# Patient Record
Sex: FEMALE | Race: BLACK OR AFRICAN AMERICAN | NOT HISPANIC OR LATINO
[De-identification: names, ages, dates, MRNs, and addresses within clinical notes are randomized per-mention and may not be internally consistent; named-entity substitution may affect disease eponyms.]

---

## 2019-06-07 ENCOUNTER — TRANSCRIPTION ENCOUNTER (OUTPATIENT)
Age: 34
End: 2019-06-07

## 2019-06-08 ENCOUNTER — INPATIENT (INPATIENT)
Facility: HOSPITAL | Age: 34
LOS: 0 days | Discharge: HOME | End: 2019-06-09
Attending: SPECIALIST | Admitting: SPECIALIST
Payer: COMMERCIAL

## 2019-06-08 VITALS
SYSTOLIC BLOOD PRESSURE: 119 MMHG | HEART RATE: 86 BPM | TEMPERATURE: 99 F | DIASTOLIC BLOOD PRESSURE: 70 MMHG | RESPIRATION RATE: 18 BRPM | OXYGEN SATURATION: 100 %

## 2019-06-08 DIAGNOSIS — K81.0 ACUTE CHOLECYSTITIS: ICD-10-CM

## 2019-06-08 LAB
ALBUMIN SERPL ELPH-MCNC: 3.7 G/DL — SIGNIFICANT CHANGE UP (ref 3.3–5)
ALP SERPL-CCNC: 71 U/L — SIGNIFICANT CHANGE UP (ref 40–120)
ALT FLD-CCNC: 219 U/L — HIGH (ref 4–33)
ANION GAP SERPL CALC-SCNC: 15 MMO/L — HIGH (ref 7–14)
APPEARANCE UR: CLEAR — SIGNIFICANT CHANGE UP
AST SERPL-CCNC: 65 U/L — HIGH (ref 4–32)
BACTERIA # UR AUTO: SIGNIFICANT CHANGE UP
BASOPHILS # BLD AUTO: 0.06 K/UL — SIGNIFICANT CHANGE UP (ref 0–0.2)
BASOPHILS NFR BLD AUTO: 0.5 % — SIGNIFICANT CHANGE UP (ref 0–2)
BILIRUB SERPL-MCNC: 0.5 MG/DL — SIGNIFICANT CHANGE UP (ref 0.2–1.2)
BILIRUB UR-MCNC: NEGATIVE — SIGNIFICANT CHANGE UP
BLOOD UR QL VISUAL: NEGATIVE — SIGNIFICANT CHANGE UP
BUN SERPL-MCNC: 8 MG/DL — SIGNIFICANT CHANGE UP (ref 7–23)
CALCIUM SERPL-MCNC: 8.9 MG/DL — SIGNIFICANT CHANGE UP (ref 8.4–10.5)
CHLORIDE SERPL-SCNC: 101 MMOL/L — SIGNIFICANT CHANGE UP (ref 98–107)
CO2 SERPL-SCNC: 18 MMOL/L — LOW (ref 22–31)
COLOR SPEC: SIGNIFICANT CHANGE UP
CREAT SERPL-MCNC: 0.64 MG/DL — SIGNIFICANT CHANGE UP (ref 0.5–1.3)
EOSINOPHIL # BLD AUTO: 0.03 K/UL — SIGNIFICANT CHANGE UP (ref 0–0.5)
EOSINOPHIL NFR BLD AUTO: 0.2 % — SIGNIFICANT CHANGE UP (ref 0–6)
GLUCOSE SERPL-MCNC: 80 MG/DL — SIGNIFICANT CHANGE UP (ref 70–99)
GLUCOSE UR-MCNC: NEGATIVE — SIGNIFICANT CHANGE UP
HCG SERPL-ACNC: SIGNIFICANT CHANGE UP MIU/ML
HCT VFR BLD CALC: 33.7 % — LOW (ref 34.5–45)
HGB BLD-MCNC: 10.9 G/DL — LOW (ref 11.5–15.5)
HYALINE CASTS # UR AUTO: NEGATIVE — SIGNIFICANT CHANGE UP
IMM GRANULOCYTES NFR BLD AUTO: 0.5 % — SIGNIFICANT CHANGE UP (ref 0–1.5)
KETONES UR-MCNC: SIGNIFICANT CHANGE UP
LEUKOCYTE ESTERASE UR-ACNC: SIGNIFICANT CHANGE UP
LIDOCAIN IGE QN: 28.9 U/L — SIGNIFICANT CHANGE UP (ref 7–60)
LYMPHOCYTES # BLD AUTO: 1.97 K/UL — SIGNIFICANT CHANGE UP (ref 1–3.3)
LYMPHOCYTES # BLD AUTO: 15.1 % — SIGNIFICANT CHANGE UP (ref 13–44)
MCHC RBC-ENTMCNC: 26.7 PG — LOW (ref 27–34)
MCHC RBC-ENTMCNC: 32.3 % — SIGNIFICANT CHANGE UP (ref 32–36)
MCV RBC AUTO: 82.6 FL — SIGNIFICANT CHANGE UP (ref 80–100)
MONOCYTES # BLD AUTO: 0.91 K/UL — HIGH (ref 0–0.9)
MONOCYTES NFR BLD AUTO: 7 % — SIGNIFICANT CHANGE UP (ref 2–14)
NEUTROPHILS # BLD AUTO: 9.99 K/UL — HIGH (ref 1.8–7.4)
NEUTROPHILS NFR BLD AUTO: 76.7 % — SIGNIFICANT CHANGE UP (ref 43–77)
NITRITE UR-MCNC: NEGATIVE — SIGNIFICANT CHANGE UP
NRBC # FLD: 0 K/UL — SIGNIFICANT CHANGE UP (ref 0–0)
PH UR: 7 — SIGNIFICANT CHANGE UP (ref 5–8)
PLATELET # BLD AUTO: 263 K/UL — SIGNIFICANT CHANGE UP (ref 150–400)
PMV BLD: 10.2 FL — SIGNIFICANT CHANGE UP (ref 7–13)
POTASSIUM SERPL-MCNC: 4.2 MMOL/L — SIGNIFICANT CHANGE UP (ref 3.5–5.3)
POTASSIUM SERPL-SCNC: 4.2 MMOL/L — SIGNIFICANT CHANGE UP (ref 3.5–5.3)
PROT SERPL-MCNC: 7.5 G/DL — SIGNIFICANT CHANGE UP (ref 6–8.3)
PROT UR-MCNC: NEGATIVE — SIGNIFICANT CHANGE UP
RBC # BLD: 4.08 M/UL — SIGNIFICANT CHANGE UP (ref 3.8–5.2)
RBC # FLD: 13.3 % — SIGNIFICANT CHANGE UP (ref 10.3–14.5)
RBC CASTS # UR COMP ASSIST: SIGNIFICANT CHANGE UP (ref 0–?)
SODIUM SERPL-SCNC: 134 MMOL/L — LOW (ref 135–145)
SP GR SPEC: 1.02 — SIGNIFICANT CHANGE UP (ref 1–1.04)
SQUAMOUS # UR AUTO: SIGNIFICANT CHANGE UP
UROBILINOGEN FLD QL: NORMAL — SIGNIFICANT CHANGE UP
WBC # BLD: 13.02 K/UL — HIGH (ref 3.8–10.5)
WBC # FLD AUTO: 13.02 K/UL — HIGH (ref 3.8–10.5)
WBC UR QL: SIGNIFICANT CHANGE UP (ref 0–?)

## 2019-06-08 PROCEDURE — 88304 TISSUE EXAM BY PATHOLOGIST: CPT | Mod: 26

## 2019-06-08 PROCEDURE — 76705 ECHO EXAM OF ABDOMEN: CPT | Mod: 26

## 2019-06-08 PROCEDURE — 76817 TRANSVAGINAL US OBSTETRIC: CPT | Mod: 26

## 2019-06-08 RX ORDER — SODIUM CHLORIDE 9 MG/ML
1000 INJECTION INTRAMUSCULAR; INTRAVENOUS; SUBCUTANEOUS ONCE
Refills: 0 | Status: COMPLETED | OUTPATIENT
Start: 2019-06-08 | End: 2019-06-08

## 2019-06-08 RX ORDER — ACETAMINOPHEN 500 MG
975 TABLET ORAL ONCE
Refills: 0 | Status: COMPLETED | OUTPATIENT
Start: 2019-06-08 | End: 2019-06-08

## 2019-06-08 RX ORDER — SODIUM CHLORIDE 9 MG/ML
1000 INJECTION, SOLUTION INTRAVENOUS
Refills: 0 | Status: DISCONTINUED | OUTPATIENT
Start: 2019-06-08 | End: 2019-06-09

## 2019-06-08 RX ORDER — ONDANSETRON 8 MG/1
4 TABLET, FILM COATED ORAL ONCE
Refills: 0 | Status: DISCONTINUED | OUTPATIENT
Start: 2019-06-08 | End: 2019-06-09

## 2019-06-08 RX ORDER — PIPERACILLIN AND TAZOBACTAM 4; .5 G/20ML; G/20ML
3.38 INJECTION, POWDER, LYOPHILIZED, FOR SOLUTION INTRAVENOUS ONCE
Refills: 0 | Status: COMPLETED | OUTPATIENT
Start: 2019-06-08 | End: 2019-06-08

## 2019-06-08 RX ORDER — HYDROMORPHONE HYDROCHLORIDE 2 MG/ML
0.5 INJECTION INTRAMUSCULAR; INTRAVENOUS; SUBCUTANEOUS
Refills: 0 | Status: DISCONTINUED | OUTPATIENT
Start: 2019-06-08 | End: 2019-06-09

## 2019-06-08 RX ORDER — PIPERACILLIN AND TAZOBACTAM 4; .5 G/20ML; G/20ML
3.38 INJECTION, POWDER, LYOPHILIZED, FOR SOLUTION INTRAVENOUS EVERY 8 HOURS
Refills: 0 | Status: DISCONTINUED | OUTPATIENT
Start: 2019-06-08 | End: 2019-06-09

## 2019-06-08 RX ORDER — MORPHINE SULFATE 50 MG/1
4 CAPSULE, EXTENDED RELEASE ORAL ONCE
Refills: 0 | Status: DISCONTINUED | OUTPATIENT
Start: 2019-06-08 | End: 2019-06-08

## 2019-06-08 RX ORDER — MORPHINE SULFATE 50 MG/1
2 CAPSULE, EXTENDED RELEASE ORAL ONCE
Refills: 0 | Status: DISCONTINUED | OUTPATIENT
Start: 2019-06-08 | End: 2019-06-08

## 2019-06-08 RX ADMIN — SODIUM CHLORIDE 1000 MILLILITER(S): 9 INJECTION INTRAMUSCULAR; INTRAVENOUS; SUBCUTANEOUS at 14:34

## 2019-06-08 RX ADMIN — SODIUM CHLORIDE 100 MILLILITER(S): 9 INJECTION, SOLUTION INTRAVENOUS at 18:36

## 2019-06-08 RX ADMIN — SODIUM CHLORIDE 100 MILLILITER(S): 9 INJECTION, SOLUTION INTRAVENOUS at 23:53

## 2019-06-08 RX ADMIN — MORPHINE SULFATE 4 MILLIGRAM(S): 50 CAPSULE, EXTENDED RELEASE ORAL at 14:34

## 2019-06-08 RX ADMIN — PIPERACILLIN AND TAZOBACTAM 200 GRAM(S): 4; .5 INJECTION, POWDER, LYOPHILIZED, FOR SOLUTION INTRAVENOUS at 18:37

## 2019-06-08 RX ADMIN — Medication 975 MILLIGRAM(S): at 14:34

## 2019-06-08 RX ADMIN — MORPHINE SULFATE 2 MILLIGRAM(S): 50 CAPSULE, EXTENDED RELEASE ORAL at 19:23

## 2019-06-08 NOTE — BRIEF OPERATIVE NOTE - COMMENTS
Ob had seen the patient in the ED and monitored the fetal heart rate. They will be re-examining the patient after the procedure.

## 2019-06-08 NOTE — CONSULT NOTE ADULT - ATTENDING COMMENTS
This pt was seen by me today. I agree with findings, assessment and plan as documented in resident note.

## 2019-06-08 NOTE — ED ADULT NURSE NOTE - CHIEF COMPLAINT QUOTE
LMP 4 states she is 9 week pregnant had an U/S to confirm the pregnancy  pt  went to [facility] for abdominal pain 2 days ago  and was diagnosed with   gall stones at that time   Pt presents with abdominal pain and vomiting x 2 days with diarrhea   PMH: Denies

## 2019-06-08 NOTE — H&P ADULT - NSHPLABSRESULTS_GEN_ALL_CORE
10.9   13.02 )-----------( 263      ( 08 Jun 2019 14:00 )             33.7     06-08    134<L>  |  101  |  8   ----------------------------<  80  4.2   |  18<L>  |  0.64    Ca    8.9      08 Jun 2019 14:00    TPro  7.5  /  Alb  3.7  /  TBili  0.5  /  DBili  x   /  AST  65<H>  /  ALT  219<H>  /  AlkPhos  71  06-08    US Transvaginal, OB (06.08.19 @ 15:28)    FINDINGS:    Uterus: Intrauterine pregnancy.    Gestational Sac Size (mean): Measuring 4.7 centimeters with linear   internal echoes.    Crown Rump Length: 2.5 cm     Estimated Gestational Age: 9 weeks 1 day based on crown-rump length.    Yolk Sac: Echogenic yolk sac, of indeterminate significance in first   trimester. Measures 6 mm.    Fetal Heart Rate: 167    Right ovary: 3.4 x 1.4 x 1.0 cm. Normal venous waveform. Arterial   waveform was not visualized.    Left ovary: Not visualized secondary to overlying bowel gas.    Fluid: Trace fluid adjacent to the right adnexa.    IMPRESSION:    Single intrauterine pregnancy.  Estimated gestational age of 9 weeks and 1 day  Estimated due date of  1/10/2020    Echogenic yolk sac and septated gestational sac of indeterminate   significance. Recommend short-term follow-up sonography.    US Abdomen Limited (06.08.19 @ 15:28)    FINDINGS:    Liver: 16.8 cm. Within normal limits.  Bile ducts: Normal caliber. Common hepatic duct measures 4 mm.   Gallbladder: Cholelithiasis and layering sludge. Wall measures up to 4   mm. Trace pericholecystic fluid. Negative sonographic Charles's sign after   administration of analgesics.  Pancreas: Visualized portions are within normal limits.  Right kidney: 12.2 cm. No hydronephrosis.  Ascites: None.  IVC: Visualized portions are within normal limits.    IMPRESSION:     Findings consistent with acute cholecystitis.

## 2019-06-08 NOTE — CONSULT NOTE ADULT - PROBLEM SELECTOR RECOMMENDATION 9
- Patient counseled with Dr. Light at bedside regarding anesthesia risks in first trimester. Discussed risks and benefits of undergoing surgery in presence of pregnancy vs. worsening maternal condition. Patient understands risk of miscarriage in 1st trimester and while this is a desired pregnancy, she desires definitive surgical management for cholecystitis after counseling. All questions answered and addressed.   - [place] at [phone] for postop fetal heart rate check. Thank you.     Seen w/ Dr. Kuldeep Khan, PGY-2

## 2019-06-08 NOTE — ED PROVIDER NOTE - ATTENDING CONTRIBUTION TO CARE
Kate: 33yo female [id number] female approx 11 weeks pregnant c/o several days of RUQ abdominal pain with multiple episodes of NB bilious vomiting. No fevers, + chills. No SOB. Pain yesterday was radiating to her chest and shoulder but not today. Pt has a known history of gallstones/biliary colic which has worsened over the last month. 2 days ago she presented to an OSH for the same and was d/c after US and labs. However she has been unable to tolerate PO since. Pain has now been constant and does not wax and wane with eating like previous.  No vaginal bleeding or fluid leakage. Exam: GENERAL: well appearing, NAD, HEENT: MMM, no scleral icterus, CARDIO: +S1/S2, no murmurs, rubs or gallops, LUNGS: CTA B/L, no wheezing, rales or rhonchi, ABD: soft, + RUQ TTP, + Charles's sign,+ suprapubic TTP, BSx4 quadrants, no guarding or rigidity. MSK: No CVA TTP EXT: No LE edema or calf TTP, 2+ distal pulses x 4 extremities. NEURO: AxOx3, SKIN: no rashes or lesions, well perfused A/P- 33 yo female with likely cholecystitis. Pt also has suprapubic TTP but has a known h/o of IUP. Will obtain cbc, cmp, lipase, VBG, RUQ US, transvaginal US, give pain meds and reassess. Risk vs benefits of meds discussed with patient, she expressed understanding and is agreeable.

## 2019-06-08 NOTE — CONSULT NOTE ADULT - SUBJECTIVE AND OBJECTIVE BOX
OBGYN Consult Note     34y  w/ LMP /5 at 9w1d by US today w/ hx of cholelithiasis and biliary colic presents with acute abdominal pain with nausea and vomiting. Patient reports history of RUQ pain for the last two years but with acute osnet the last few days with nausea and vomiting and inability to tolerate PO. She was seen at outside hospital two days ago and was discharged but reports that pain has persisted and worsened, causing her to present here today. She also reports chills.     Reports being seen at Urgent Care two and a half week ago for discomfort and was found to be pregnant. She had an ultrasound at that time which showed two sacs at 6.5 weeks. Since that time she has had a subsequent ultrasound showing vanishing twin. She denies any other complications and has been followed by OB at [facility], who she reports is aware she is here and undergoing surgery. This is a desired pregnancy.     OB/GYN HISTORY:  - 2 FT NDVD (, ) both pregnancies uncomplicated. 3 TOP, 2 D&C and 1 medical. Denies any gyn history.     Last Menstrual Period:      Name of GYN Physician: Dr. Elysia Bradley ([facility])      PAST MEDICAL & SURGICAL HISTORY:  Biliary colic      REVIEW OF SYSTEMS  +N/V     MEDICATIONS  (STANDING):  lactated ringers. 1000 milliLiter(s) (100 mL/Hr) IV Continuous <Continuous>  piperacillin/tazobactam IVPB. 3.375 Gram(s) IV Intermittent every 8 hours      Allergies  No known drug Allergies      Vital Signs Last 24 Hrs  T(C): 37.1 (2019 18:57), Max: 37.1 (2019 12:28)  T(F): 98.7 (2019 18:57), Max: 98.7 (2019 12:28)  HR: 80 (2019 18:57) (80 - 86)  BP: 108/63 (2019 18:57) (108/63 - 122/66)  BP(mean): --  RR: 16 (2019 18:57) (16 - 18)  SpO2: 100% (2019 18:57) (100% - 100%)    PHYSICAL EXAM:    Constitutional: alert and oriented x 3    Gastrointestinal: soft, gravid, tenderness in RUQ     Genitourinary: deferred       LABS:                        10.9   13.02 )-----------( 263      ( 2019 14:00 )             33.7         134<L>  |  101  |  8   ----------------------------<  80  4.2   |  18<L>  |  0.64    Ca    8.9      2019 14:00    TPro  7.5  /  Alb  3.7  /  TBili  0.5  /  DBili  x   /  AST  65<H>  /  ALT  219<H>  /  AlkPhos  71        Urinalysis Basic - ( 2019 14:12 )    Color: LIGHT YELLOW / Appearance: CLEAR / S.019 / pH: 7.0  Gluc: NEGATIVE / Ketone: SMALL  / Bili: NEGATIVE / Urobili: NORMAL   Blood: NEGATIVE / Protein: NEGATIVE / Nitrite: NEGATIVE   Leuk Esterase: MODERATE / RBC: 0-2 / WBC 3-5   Sq Epi: FEW / Non Sq Epi: x / Bacteria: 1+        RADIOLOGY & ADDITIONAL STUDIES:  < from: US Transvaginal, OB (19 @ 15:28) >  EXAM:  US OB TRANSVAGINAL        PROCEDURE DATE:  2019     INTERPRETATION:  CLINICAL INFORMATION: 9 weeks pregnant by US   confirmation, history of  gallstone presents with abdominal pain. Serum hCG  [id number]. Patient   reports being told that there are 2 gestational sacs.    LMP: 2019    Estimated Gestational Age by LMP: 9 weeks and 1 day.    COMPARISON: None available.    Endovaginal pelvic sonogram as per order. Transabdominal pelvic sonogram   was also performed as part of our standard protocol. Color and Spectral   Doppler was performed.    FINDINGS:    Uterus: Intrauterine pregnancy.    Gestational Sac Size (mean): Measuring 4.7 centimeters with linear   internal echoes.    Crown Rump Length: 2.5 cm     Estimated Gestational Age: 9 weeks 1 day based on crown-rump length.    Yolk Sac: Echogenic yolk sac, of indeterminate significance in first   trimester. Measures 6 mm.    Fetal Heart Rate: 167    Right ovary: 3.4 x 1.4 x 1.0 cm. Normal venous waveform. Arterial   waveform was not visualized.    Left ovary: Not visualized secondary to overlying bowel gas.    Fluid: Trace fluid adjacent to the right adnexa.    IMPRESSION:    Single intrauterine pregnancy.  Estimated gestational age of 9 weeks and 1 day  Estimated due date of  1/10/2020    Echogenic yolk sac and septated gestational sac of indeterminate   significance. Recommend short-term follow-up sonography.      SKY SAAB M.D., RADIOLOGY RESIDENT  This document has been electronically signed.  AUDREY HUIZAR M.D., ATTENDING RADIOLOGIST  This document has been electronically signed. 2019  4:08PM    < end of copied text >  ===========================================================  < from: US Abdomen Limited (19 @ 15:28) >    EXAM:  US ABDOMEN LIMITED        PROCEDURE DATE:  2019         INTERPRETATION:  CLINICAL INFORMATION: Right upper quadrant pain. History   of gallstones. Pregnant.    TECHNIQUE: Sonography of the right upper quadrant.     COMPARISON: None available.    FINDINGS:    Liver: 16.8 cm. Within normal limits.  Bile ducts: Normal caliber. Common hepatic duct measures 4 mm.   Gallbladder: Cholelithiasis and layering sludge. Wall measures up to 4   mm. Trace pericholecystic fluid. Negative sonographic Charles's sign after   administration of analgesics.  Pancreas: Visualized portions are within normal limits.  Right kidney: 12.2 cm. No hydronephrosis.  Ascites: None.  IVC: Visualized portions are within normal limits.    IMPRESSION:     Findings consistent with acute cholecystitis.    SKY SAAB M.D., RADIOLOGY RESIDENT  This document has been electronically signed.  AUDREY HUIZAR M.D., ATTENDING RADIOLOGIST  This document has been electronically signed. 2019  4:22PM      < end of copied text >

## 2019-06-08 NOTE — ED ADULT TRIAGE NOTE - CHIEF COMPLAINT QUOTE
LMP 4 states she is 9 week pregnant had an U/S to confirm the pregnancy  pt  went to [facility] for abdominal pain 2 days ago  and was diagnosed with   gall stones at that time   Pt presents with abdominal pain and vomiting x 2 days with diarrhea   PMH: Denies

## 2019-06-08 NOTE — ED ADULT NURSE REASSESSMENT NOTE - NS ED NURSE REASSESS COMMENT FT1
Received report from WILBERTO Adams. Pt Aox4, ambulatory, pt scheduled to go to OR, pt reports surgeon just at bedside. Pt preop check list done, IV access noted Lft AC, LR running at 100ml as ordered, family at bedside for belongings, will continue to monitor. Received report from WILBERTO Adams. Pt Aox4, ambulatory, pt scheduled to go to OR, pt reports surgeon just at bedside. Pt preop check list done, IV access noted Lft AC, LR running at 100ml as ordered, family at bedside for belongings, report given to OR WILBERTO Wynn, will continue to monitor.

## 2019-06-08 NOTE — BRIEF OPERATIVE NOTE - NSICDXBRIEFPOSTOP_GEN_ALL_CORE_FT
POST-OP DIAGNOSIS:  Hydrops of gallbladder 09-Jun-2019 00:27:07  Talat Ruffin  Cholecystitis, acute 09-Jun-2019 00:23:57  Talat Ruffin

## 2019-06-08 NOTE — H&P ADULT - ASSESSMENT
34F PMH cholelithiasis with biliary colic, 9 weeks pregnant by US confirmation, presenting with abdominal pain found on imaging to have acute cholecystitis.    - Admit to B team surgery  - Appreciate OB consultation - discussion had with patient regarding risk of general anesthesia and surgery in pregnancy. Patient appears agreeable however may benefit from further discussion re: OB team  - NPO/IVF for now  - If patient continues to be agreeable to operative intervention after OB consultation, can plan for Lap cholecystectomy on this admission  - IV abx  - Discussed with attending Dr. Arlen Biswas PGY2  B team surgery  [id number]

## 2019-06-08 NOTE — ED PROVIDER NOTE - SHIFT CHANGE DETAILS
I have signed over this patient to the above attending physician. Pertinent history, physical exam findings and workup thus far in the ED have been discussed. The pending tests and plan, including surgery evaluation and recommendations were signed over.  All questions from the above attending physician have been answered.

## 2019-06-08 NOTE — ED ADULT NURSE NOTE - OBJECTIVE STATEMENT
Received pt from intake area. Pt A/O x 3 calm cooperative, no acute distress noted. Pt ambulatory, gait steady but slow. Pt is 9 weeks pregnant, C/o pain to abdomen intermittently x 1 month with nausea/vomiting x 2 days. Pt has been vomiting bile x 2 days. Pt states pain initially 8/10 upon arrival to ED but now 5/10 pain scale which is tolerable for her at this time. Denies nausea at present. Denies any vaginal bleeding or abnormal discharge. Pt being admitted to surgery for acute cholecystitis.

## 2019-06-08 NOTE — ED PROVIDER NOTE - CLINICAL SUMMARY MEDICAL DECISION MAKING FREE TEXT BOX
34F 9weeks by US p/w abd pain. RUQ ttp - will get US, labs, surg consult. Well appearing and HDS. Reassess

## 2019-06-08 NOTE — CONSULT NOTE ADULT - ASSESSMENT
34y  w/ LMP 4/5 at 9w1d by US today w/ hx of cholelithiasis and biliary colic presents with acute abdominal pain with nausea and vomiting, in stable condition.

## 2019-06-08 NOTE — BRIEF OPERATIVE NOTE - OPERATION/FINDINGS
Laparoscopic cholecystectomy performed. Gallbladder markedly distended, and needle decompression was performed. Omental adhesions to gallbladder taken down with combination of sharp and blunt dissection. Peritoneum divided, and dissection performed until critical view of cystic duct and artery obtained. Cystic duct appeared dilated, and was milked with [place] forceps. A stone was identified in the distal gallbladder neck. The cystic duct was ligated with a hem-o-lock clip, and an endo-loop ligature. Cystic artery was identified, and duct ligated with hem-o-lock clips and artery was divided sharply. Gallbladder dissected from liver bed with electrocautery. Clips in place, and there was no extravasation of bile. A small area of the first portion of the duodenum, where dense adhesions to the gallbladder bed were lysed, was continuously oozing at a slow rate. This area was coated with Elizabeth powder, and was thereafter hemostatic.

## 2019-06-08 NOTE — H&P ADULT - NSHPPHYSICALEXAM_GEN_ALL_CORE
Gen: Laying in bed, in NAD  Resp: Nonlabored, no increased WOB  CV: RRR, no m/r/g  Abd: Soft, gravid. RUQ TTP. No rebound or guarding  Ext: No C/C/E

## 2019-06-08 NOTE — ED PROVIDER NOTE - OBJECTIVE STATEMENT
34F 9 weeks pregnant by US confirmation, hx of gallstone p/w abd pain. Patient has had "gall bladder" pain for two years. REad online that it will get worse in pregnancy. Can't bare the pain and wants gallbladder removed. Went to OSH 2 days ago and had US and told that she had gallstones but no inflammation. Since leaving the hospital, has had mult episodes of vomiting and "feels hot." Denies vaginal discharge, urinary symptoms, cp, sob, no other complaints.

## 2019-06-08 NOTE — H&P ADULT - HISTORY OF PRESENT ILLNESS
34F PMH cholelithiasis with biliary colic, 9 weeks pregnant by US confirmation, presenting with abdominal pain. Patient states that she has had intermittent RUQ pain x 2 years, diagnosed as biliary colic several months ago. Pain is worst in the RUQ and associated with N/V. Pain has been increasingly severe and persistent for the past 2 days. Pt initially presented to OSH where she underwent US which reportedly showed cholelithiasis without cholecystitis. Patient was discharged home, however pain continued to worsen at home, prompting presentation to [facility] this AM for further evaluation. Endorses nausea with NBNB emesis. Endorses chills.     Upon arrival to [facility] ED, AVSS. Afebrile. Normotensive. WBC 13.02. AST/ALT 65/219. Intrauterine pregnancy confirmed on transvaginal US. RUQ US obtained which demonstrated cholelithiasis, sludge, and trace pericholecystic fluid consistent with acute cholecystitis.

## 2019-06-09 ENCOUNTER — TRANSCRIPTION ENCOUNTER (OUTPATIENT)
Age: 34
End: 2019-06-09

## 2019-06-09 VITALS
DIASTOLIC BLOOD PRESSURE: 66 MMHG | RESPIRATION RATE: 18 BRPM | OXYGEN SATURATION: 100 % | TEMPERATURE: 99 F | HEART RATE: 90 BPM | SYSTOLIC BLOOD PRESSURE: 113 MMHG

## 2019-06-09 RX ORDER — SODIUM CHLORIDE 9 MG/ML
1000 INJECTION INTRAMUSCULAR; INTRAVENOUS; SUBCUTANEOUS
Refills: 0 | Status: DISCONTINUED | OUTPATIENT
Start: 2019-06-09 | End: 2019-06-09

## 2019-06-09 RX ORDER — ACETAMINOPHEN 500 MG
650 TABLET ORAL EVERY 6 HOURS
Refills: 0 | Status: DISCONTINUED | OUTPATIENT
Start: 2019-06-09 | End: 2019-06-09

## 2019-06-09 RX ORDER — OXYCODONE HYDROCHLORIDE 5 MG/1
5 TABLET ORAL EVERY 4 HOURS
Refills: 0 | Status: DISCONTINUED | OUTPATIENT
Start: 2019-06-09 | End: 2019-06-09

## 2019-06-09 RX ORDER — OXYCODONE HYDROCHLORIDE 5 MG/1
1 TABLET ORAL
Qty: 0 | Refills: 0 | DISCHARGE
Start: 2019-06-09

## 2019-06-09 RX ORDER — OXYCODONE HYDROCHLORIDE 5 MG/1
10 TABLET ORAL EVERY 4 HOURS
Refills: 0 | Status: DISCONTINUED | OUTPATIENT
Start: 2019-06-09 | End: 2019-06-09

## 2019-06-09 RX ORDER — ACETAMINOPHEN 500 MG
2 TABLET ORAL
Qty: 0 | Refills: 0 | DISCHARGE
Start: 2019-06-09

## 2019-06-09 RX ORDER — ENOXAPARIN SODIUM 100 MG/ML
40 INJECTION SUBCUTANEOUS DAILY
Refills: 0 | Status: DISCONTINUED | OUTPATIENT
Start: 2019-06-09 | End: 2019-06-09

## 2019-06-09 RX ADMIN — Medication 650 MILLIGRAM(S): at 11:50

## 2019-06-09 RX ADMIN — OXYCODONE HYDROCHLORIDE 10 MILLIGRAM(S): 5 TABLET ORAL at 01:39

## 2019-06-09 RX ADMIN — Medication 650 MILLIGRAM(S): at 05:46

## 2019-06-09 RX ADMIN — Medication 650 MILLIGRAM(S): at 12:20

## 2019-06-09 RX ADMIN — OXYCODONE HYDROCHLORIDE 10 MILLIGRAM(S): 5 TABLET ORAL at 02:09

## 2019-06-09 RX ADMIN — ENOXAPARIN SODIUM 40 MILLIGRAM(S): 100 INJECTION SUBCUTANEOUS at 11:50

## 2019-06-09 RX ADMIN — Medication 650 MILLIGRAM(S): at 05:16

## 2019-06-09 RX ADMIN — SODIUM CHLORIDE 75 MILLILITER(S): 9 INJECTION INTRAMUSCULAR; INTRAVENOUS; SUBCUTANEOUS at 01:38

## 2019-06-09 NOTE — DISCHARGE NOTE PROVIDER - NSDCFUADDINST_GEN_ALL_CORE_FT
WOUND CARE:  Please keep incisions clean/dry/intact.  BATHING: Please do not submerge wound underwater. You may shower and/or sponge bathe.  ACTIVITY: No heavy lifting or straining. Otherwise, you may return to your usual level of physical activity. If you are taking narcotic pain medication (such as Percocet) DO NOT drive a car, operate machinery or make important decisions.  DIET: Return to your usual diet.  NOTIFY YOUR SURGEON IF: You have any bleeding that does not stop, any pus draining from your wound(s), any fever (over 100.4 F) or chills, persistent nausea/vomiting, persistent diarrhea, or if your pain is not controlled on your discharge pain medications.  FOLLOW-UP:   -Please follow up with your primary care physician in one week regarding your hospitalization  -Please follow up with Dr. Mckinley in office in 1-2 weeks following discharge. Please call [phone] to set up appointment.

## 2019-06-09 NOTE — CHART NOTE - NSCHARTNOTEFT_GEN_A_CORE
Post-operative Check    SUBJECTIVE: No acute events in the immediate post-operative period. Pain well controlled. Denies n/v. Tolerating cld. Reports she is feeling well. Has voided. Per OB, pt seen and .    OBJECTIVE:  T(C): 36.9 (06-09-19 @ 02:00), Max: 37.1 (06-08-19 @ 12:28)  HR: 87 (06-09-19 @ 02:00) (80 - 99)  BP: 111/74 (06-09-19 @ 02:00) (108/63 - 123/70)  RR: 17 (06-09-19 @ 02:00) (11 - 18)  SpO2: 99% (06-09-19 @ 02:00) (97% - 100%)      06-08-19 @ 07:01  -  06-09-19 @ 03:43  --------------------------------------------------------  IN: 425 mL / OUT: 250 mL / NET: 175 mL        Physical Exam:   NAD, awake and alert  Respirations nonlabored  Abdomen soft, appropriately tender, nondistended  No guarding or rebound tenderness    ASSESSMENT:   LARISSA GARCIA is a 34y Female POD#0 from [facility], stable on floor.     PLAN:  - Pain management  - Follow UOP  - cld  - f/u ob  - f/u am labs  - dvt ppx    B Surgery [id number]

## 2019-06-09 NOTE — DISCHARGE NOTE PROVIDER - NSDCCPCAREPLAN_GEN_ALL_CORE_FT
PRINCIPAL DISCHARGE DIAGNOSIS  Diagnosis: Acute cholecystitis  Assessment and Plan of Treatment: s/p lap mg

## 2019-06-09 NOTE — DISCHARGE NOTE NURSING/CASE MANAGEMENT/SOCIAL WORK - NSDCDPATPORTLINK_GEN_ALL_CORE
You can access the [facility] Patient Portal, offered by [facility], by registering with the following website: [email or web address]

## 2019-06-09 NOTE — PROGRESS NOTE ADULT - SUBJECTIVE AND OBJECTIVE BOX
General Surgery Progress Note    SUBJECTIVE:  The patient was seen and examined. No acute events overnight. Pain controlled.  Denies n/v, cp, sob, abd pain.    OBJECTIVE:     ** VITAL SIGNS / I&O's **    Vital Signs Last 24 Hrs  T(C): 36.9 (2019 08:58), Max: 37.1 (2019 12:28)  T(F): 98.5 (2019 08:58), Max: 98.7 (2019 12:28)  HR: 88 (2019 08:58) (80 - 99)  BP: 110/69 (2019 08:58) (103/60 - 123/70)  BP(mean): 80 (2019 01:00) (73 - 83)  RR: 18 (2019 08:58) (11 - 18)  SpO2: 98% (2019 08:58) (97% - 100%)      2019 07:01  -  2019 07:00  --------------------------------------------------------  IN:    Oral Fluid: 450 mL    sodium chloride 0.9%: 75 mL  Total IN: 525 mL    OUT:    Voided: 650 mL  Total OUT: 650 mL    Total NET: -125 mL      2019 07:01  -  2019 10:36  --------------------------------------------------------  IN:    Oral Fluid: 120 mL  Total IN: 120 mL    OUT:  Total OUT: 0 mL    Total NET: 120 mL          ** PHYSICAL EXAM **    -- CONSTITUTIONAL: Alert, NAD  -- PULMONARY: non-labored respirations  -- ABDOMEN: soft, non-distended, appropriately tender; c/d/i    ** LABS **                          10.9   13.02 )-----------( 263      ( 2019 14:00 )             33.7     2019 14:00    134    |  101    |  8      ----------------------------<  80     4.2     |  18     |  0.64     Ca    8.9        2019 14:00    TPro  7.5    /  Alb  3.7    /  TBili  0.5    /  DBili  x      /  AST  65     /  ALT  219    /  AlkPhos  71     2019 14:00      CAPILLARY BLOOD GLUCOSE            LIVER FUNCTIONS - ( 2019 14:00 )  Alb: 3.7 g/dL / Pro: 7.5 g/dL / ALK PHOS: 71 u/L / ALT: 219 u/L / AST: 65 u/L / GGT: x             Urinalysis Basic - ( 2019 14:12 )    Color: LIGHT YELLOW / Appearance: CLEAR / S.019 / pH: 7.0  Gluc: NEGATIVE / Ketone: SMALL  / Bili: NEGATIVE / Urobili: NORMAL   Blood: NEGATIVE / Protein: NEGATIVE / Nitrite: NEGATIVE   Leuk Esterase: MODERATE / RBC: 0-2 / WBC 3-5   Sq Epi: FEW / Non Sq Epi: x / Bacteria: 1+        MEDICATIONS  (STANDING):  acetaminophen   Tablet .. 650 milliGRAM(s) Oral every 6 hours  enoxaparin Injectable 40 milliGRAM(s) SubCutaneous daily    MEDICATIONS  (PRN):  oxyCODONE    IR 5 milliGRAM(s) Oral every 4 hours PRN Moderate Pain (4 - 6)  oxyCODONE    IR 10 milliGRAM(s) Oral every 4 hours PRN Severe Pain (7 - 10)

## 2019-06-09 NOTE — DISCHARGE NOTE NURSING/CASE MANAGEMENT/SOCIAL WORK - NSDCPNDISPN_GEN_ALL_CORE
Activities of daily living, including home environment that might     exacerbate pain or reduce effectiveness of the pain management plan of care as well as strategies to address these issues/Opioids not applicable/not prescribed/Education provided on the pain management plan of care/Side effects of pain management treatment

## 2019-06-09 NOTE — CHART NOTE - NSCHARTNOTEFT_GEN_A_CORE
R2 OB Chart Note     Patient seen at bedside for postop fetal heart rate check by ultrasound. FHR 170bpm. Printed copy of ultrasound placed in chart. Should there be any further questions, please contact OBGYN at pager [phone]. Thank you.    SANDRA Khan, PGY-2

## 2019-06-09 NOTE — DISCHARGE NOTE PROVIDER - HOSPITAL COURSE
34F PMH cholelithiasis with biliary colic, 9 weeks pregnant by US confirmation, who presented on 6/8 with abdominal pain. Patient stated she had had intermittent RUQ pain x 2 years, diagnosed as biliary colic several months ago. She reported that pain was worst in the RUQ and associated with N/V. Pain had been increasingly severe and persistent for the past 2 days. Pt had initially presented to an OSH where she underwent US which reportedly showed cholelithiasis without cholecystitis. Patient was discharged home, however pain continued to worsen at home, prompting presentation to [facility] for further evaluation.     On presentation, she endorsed nausea with NBNB emesis, and chills. In ED, pt was AVSS. WBC was 13.02 and AST/ALT was 65/219. Intrauterine pregnancy was confirmed on transvaginal US. RUQ US obtained demonstrated cholelithiasis, sludge, and trace pericholecystic fluid consistent with acute cholecystitis.     Pt underwent an uneventful laparoscopic cholecystectomy on 6/8. She remained hemodynamically stable postoperatively and was discharged on POD1 tolerating diet, ambulating, regular bowel function, voiding appropriately.    She is expected to follow up with Dr. Mckinley in office in 1-2 weeks following discharge. 34F PMH cholelithiasis with biliary colic, 9 weeks pregnant by US confirmation, who presented on 6/8 with abdominal pain. Patient stated she had had intermittent RUQ pain x 2 years, diagnosed as biliary colic several months ago. She reported that pain was worst in the RUQ and associated with N/V. Pain had been increasingly severe and persistent for the past 2 days. Pt had initially presented to an OSH where she underwent US which reportedly showed cholelithiasis without cholecystitis. Patient was discharged home, however pain continued to worsen at home, prompting presentation to [facility] for further evaluation.     On presentation, she endorsed nausea with NBNB emesis, and chills. In ED, pt was AVSS. WBC was 13.02 and AST/ALT was 65/219. Intrauterine pregnancy was confirmed on transvaginal US. RUQ US obtained demonstrated cholelithiasis, sludge, and trace pericholecystic fluid consistent with acute cholecystitis.     Pt underwent an uneventful laparoscopic cholecystectomy on 6/8. Postoperatively, pt was seen at bedside for postop fetal heart rate check with ultrasound by OB/GYN. FHR was 170bpm. Pt remained hemodynamically stable postoperatively and was discharged on POD1 tolerating diet, ambulating, regular bowel function, voiding appropriately. She is expected to follow up with Dr. Mckinley in office in 1-2 weeks following discharge.
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
## 2019-06-14 LAB — SURGICAL PATHOLOGY STUDY: SIGNIFICANT CHANGE UP

## 2019-07-15 NOTE — DISCHARGE NOTE PROVIDER - CARE PROVIDER_API CALL
Vernon Mckinley)  Surgery  [address], [address]  [place] [address]  Phone: [phone]  Fax: [phone]  Follow Up Time: No

## 2023-03-03 PROBLEM — K80.50 CALCULUS OF BILE DUCT WITHOUT CHOLANGITIS OR CHOLECYSTITIS WITHOUT OBSTRUCTION: Chronic | Status: ACTIVE | Noted: 2019-06-08

## 2023-04-27 PROBLEM — Z00.00 ENCOUNTER FOR PREVENTIVE HEALTH EXAMINATION: Status: ACTIVE | Noted: 2023-04-27

## 2023-05-05 ENCOUNTER — APPOINTMENT (OUTPATIENT)
Dept: OBGYN | Facility: CLINIC | Age: 38
End: 2023-05-05

## 2023-11-24 ENCOUNTER — EMERGENCY (EMERGENCY)
Facility: HOSPITAL | Age: 38
LOS: 1 days | Discharge: HOME | End: 2023-11-24
Admitting: STUDENT IN AN ORGANIZED HEALTH CARE EDUCATION/TRAINING PROGRAM
Payer: COMMERCIAL

## 2023-11-24 VITALS
OXYGEN SATURATION: 100 % | TEMPERATURE: 99 F | SYSTOLIC BLOOD PRESSURE: 133 MMHG | RESPIRATION RATE: 16 BRPM | DIASTOLIC BLOOD PRESSURE: 90 MMHG | HEART RATE: 80 BPM

## 2023-11-24 VITALS
RESPIRATION RATE: 18 BRPM | DIASTOLIC BLOOD PRESSURE: 88 MMHG | SYSTOLIC BLOOD PRESSURE: 134 MMHG | HEART RATE: 85 BPM | TEMPERATURE: 99 F | OXYGEN SATURATION: 100 %

## 2023-11-24 PROCEDURE — 99284 EMERGENCY DEPT VISIT MOD MDM: CPT

## 2023-11-24 PROCEDURE — 70486 CT MAXILLOFACIAL W/O DYE: CPT | Mod: 26,MA

## 2023-11-24 PROCEDURE — 70450 CT HEAD/BRAIN W/O DYE: CPT | Mod: 26,MA

## 2023-11-24 RX ORDER — KETOROLAC TROMETHAMINE 30 MG/ML
30 SYRINGE (ML) INJECTION ONCE
Refills: 0 | Status: DISCONTINUED | OUTPATIENT
Start: 2023-11-24 | End: 2023-11-24

## 2023-11-24 RX ADMIN — Medication 30 MILLIGRAM(S): at 23:03
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
## 2023-11-24 NOTE — ED PROVIDER NOTE - PHYSICAL EXAMINATION
Face: + right sided lower jaw swelling, no open wounds, no ecchymosis, able to fully open jaw, no trismus  No c-spine tenderness, no step offs

## 2023-11-24 NOTE — ED PROVIDER NOTE - PATIENT PORTAL LINK FT
You can access the FollowMyHealth Patient Portal offered by [facility] by registering at the following website: [email or web address]. By joining [employer]’s FollowMyHealth portal, you will also be able to view your health information using other applications (apps) compatible with our system.
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
## 2025-04-09 ENCOUNTER — EMERGENCY (EMERGENCY)
Facility: HOSPITAL | Age: 40
LOS: 1 days | End: 2025-04-09
Attending: EMERGENCY MEDICINE | Admitting: EMERGENCY MEDICINE
Payer: COMMERCIAL

## 2025-04-09 VITALS
HEART RATE: 70 BPM | DIASTOLIC BLOOD PRESSURE: 82 MMHG | TEMPERATURE: 98 F | OXYGEN SATURATION: 100 % | RESPIRATION RATE: 16 BRPM | SYSTOLIC BLOOD PRESSURE: 137 MMHG

## 2025-04-09 VITALS
OXYGEN SATURATION: 98 % | HEIGHT: 65 IN | HEART RATE: 101 BPM | WEIGHT: 179.9 LBS | SYSTOLIC BLOOD PRESSURE: 141 MMHG | TEMPERATURE: 98 F | DIASTOLIC BLOOD PRESSURE: 90 MMHG | RESPIRATION RATE: 19 BRPM

## 2025-04-09 LAB
ALBUMIN SERPL ELPH-MCNC: 4 G/DL — SIGNIFICANT CHANGE UP (ref 3.3–5)
ALP SERPL-CCNC: 45 U/L — SIGNIFICANT CHANGE UP (ref 40–120)
ALT FLD-CCNC: 20 U/L — SIGNIFICANT CHANGE UP (ref 4–33)
ANION GAP SERPL CALC-SCNC: 11 MMOL/L — SIGNIFICANT CHANGE UP (ref 7–14)
AST SERPL-CCNC: 23 U/L — SIGNIFICANT CHANGE UP (ref 4–32)
BASOPHILS # BLD AUTO: 0.05 K/UL — SIGNIFICANT CHANGE UP (ref 0–0.2)
BASOPHILS NFR BLD AUTO: 0.6 % — SIGNIFICANT CHANGE UP (ref 0–2)
BILIRUB SERPL-MCNC: 0.3 MG/DL — SIGNIFICANT CHANGE UP (ref 0.2–1.2)
BUN SERPL-MCNC: 13 MG/DL — SIGNIFICANT CHANGE UP (ref 7–23)
CALCIUM SERPL-MCNC: 9.4 MG/DL — SIGNIFICANT CHANGE UP (ref 8.4–10.5)
CHLORIDE SERPL-SCNC: 104 MMOL/L — SIGNIFICANT CHANGE UP (ref 98–107)
CK SERPL-CCNC: 107 U/L — SIGNIFICANT CHANGE UP (ref 25–170)
CO2 SERPL-SCNC: 23 MMOL/L — SIGNIFICANT CHANGE UP (ref 22–31)
CREAT SERPL-MCNC: 1.04 MG/DL — SIGNIFICANT CHANGE UP (ref 0.5–1.3)
EGFR: 70 ML/MIN/1.73M2 — SIGNIFICANT CHANGE UP
EGFR: 70 ML/MIN/1.73M2 — SIGNIFICANT CHANGE UP
EOSINOPHIL # BLD AUTO: 0.09 K/UL — SIGNIFICANT CHANGE UP (ref 0–0.5)
EOSINOPHIL NFR BLD AUTO: 1.1 % — SIGNIFICANT CHANGE UP (ref 0–6)
ETHANOL SERPL-MCNC: <10 MG/DL — SIGNIFICANT CHANGE UP
GLUCOSE SERPL-MCNC: 85 MG/DL — SIGNIFICANT CHANGE UP (ref 70–99)
HCG SERPL-ACNC: <1 MIU/ML — SIGNIFICANT CHANGE UP
HCT VFR BLD CALC: 41.7 % — SIGNIFICANT CHANGE UP (ref 34.5–45)
HGB BLD-MCNC: 13.7 G/DL — SIGNIFICANT CHANGE UP (ref 11.5–15.5)
HIV 1+2 AB+HIV1 P24 AG SERPL QL IA: SIGNIFICANT CHANGE UP
IANC: 4.54 K/UL — SIGNIFICANT CHANGE UP (ref 1.8–7.4)
IMM GRANULOCYTES NFR BLD AUTO: 0.4 % — SIGNIFICANT CHANGE UP (ref 0–0.9)
LACTATE SERPL-SCNC: 0.7 MMOL/L — SIGNIFICANT CHANGE UP (ref 0.5–2)
LIDOCAIN IGE QN: 31 U/L — SIGNIFICANT CHANGE UP (ref 7–60)
LYMPHOCYTES # BLD AUTO: 2.59 K/UL — SIGNIFICANT CHANGE UP (ref 1–3.3)
LYMPHOCYTES # BLD AUTO: 33 % — SIGNIFICANT CHANGE UP (ref 13–44)
MCHC RBC-ENTMCNC: 28.1 PG — SIGNIFICANT CHANGE UP (ref 27–34)
MCHC RBC-ENTMCNC: 32.9 G/DL — SIGNIFICANT CHANGE UP (ref 32–36)
MCV RBC AUTO: 85.6 FL — SIGNIFICANT CHANGE UP (ref 80–100)
MONOCYTES # BLD AUTO: 0.54 K/UL — SIGNIFICANT CHANGE UP (ref 0–0.9)
MONOCYTES NFR BLD AUTO: 6.9 % — SIGNIFICANT CHANGE UP (ref 2–14)
NEUTROPHILS # BLD AUTO: 4.54 K/UL — SIGNIFICANT CHANGE UP (ref 1.8–7.4)
NEUTROPHILS NFR BLD AUTO: 58 % — SIGNIFICANT CHANGE UP (ref 43–77)
NRBC # BLD AUTO: 0 K/UL — SIGNIFICANT CHANGE UP (ref 0–0)
NRBC # FLD: 0 K/UL — SIGNIFICANT CHANGE UP (ref 0–0)
NRBC BLD AUTO-RTO: 0 /100 WBCS — SIGNIFICANT CHANGE UP (ref 0–0)
PLATELET # BLD AUTO: 382 K/UL — SIGNIFICANT CHANGE UP (ref 150–400)
POTASSIUM SERPL-MCNC: 3.8 MMOL/L — SIGNIFICANT CHANGE UP (ref 3.5–5.3)
POTASSIUM SERPL-SCNC: 3.8 MMOL/L — SIGNIFICANT CHANGE UP (ref 3.5–5.3)
PROT SERPL-MCNC: 7.7 G/DL — SIGNIFICANT CHANGE UP (ref 6–8.3)
RBC # BLD: 4.87 M/UL — SIGNIFICANT CHANGE UP (ref 3.8–5.2)
RBC # FLD: 13.2 % — SIGNIFICANT CHANGE UP (ref 10.3–14.5)
SODIUM SERPL-SCNC: 138 MMOL/L — SIGNIFICANT CHANGE UP (ref 135–145)
WBC # BLD: 7.84 K/UL — SIGNIFICANT CHANGE UP (ref 3.8–10.5)
WBC # FLD AUTO: 7.84 K/UL — SIGNIFICANT CHANGE UP (ref 3.8–10.5)

## 2025-04-09 PROCEDURE — 72128 CT CHEST SPINE W/O DYE: CPT | Mod: 26

## 2025-04-09 PROCEDURE — 99285 EMERGENCY DEPT VISIT HI MDM: CPT

## 2025-04-09 PROCEDURE — 73060 X-RAY EXAM OF HUMERUS: CPT | Mod: 26,LT

## 2025-04-09 PROCEDURE — 73100 X-RAY EXAM OF WRIST: CPT | Mod: 26,RT

## 2025-04-09 PROCEDURE — 74177 CT ABD & PELVIS W/CONTRAST: CPT | Mod: 26

## 2025-04-09 PROCEDURE — 72125 CT NECK SPINE W/O DYE: CPT | Mod: 26

## 2025-04-09 PROCEDURE — 72170 X-RAY EXAM OF PELVIS: CPT | Mod: 26

## 2025-04-09 PROCEDURE — 71260 CT THORAX DX C+: CPT | Mod: 26

## 2025-04-09 PROCEDURE — 73120 X-RAY EXAM OF HAND: CPT | Mod: 26,RT

## 2025-04-09 PROCEDURE — 73030 X-RAY EXAM OF SHOULDER: CPT | Mod: 26,LT

## 2025-04-09 PROCEDURE — 70450 CT HEAD/BRAIN W/O DYE: CPT | Mod: 26

## 2025-04-09 PROCEDURE — 73562 X-RAY EXAM OF KNEE 3: CPT | Mod: 26,RT

## 2025-04-09 PROCEDURE — 71045 X-RAY EXAM CHEST 1 VIEW: CPT | Mod: 26

## 2025-04-09 PROCEDURE — 73590 X-RAY EXAM OF LOWER LEG: CPT | Mod: 26,RT

## 2025-04-09 RX ORDER — ACETAMINOPHEN 500 MG/5ML
650 LIQUID (ML) ORAL ONCE
Refills: 0 | Status: COMPLETED | OUTPATIENT
Start: 2025-04-09 | End: 2025-04-09

## 2025-04-09 RX ORDER — DIAZEPAM 5 MG/1
2 TABLET ORAL ONCE
Refills: 0 | Status: DISCONTINUED | OUTPATIENT
Start: 2025-04-09 | End: 2025-04-09

## 2025-04-09 RX ORDER — DIAZEPAM 5 MG/1
1 TABLET ORAL
Qty: 9 | Refills: 0
Start: 2025-04-09 | End: 2025-04-11

## 2025-04-09 RX ADMIN — Medication 650 MILLIGRAM(S): at 14:39

## 2025-04-09 RX ADMIN — Medication 1000 MILLILITER(S): at 15:35

## 2025-04-09 RX ADMIN — DIAZEPAM 2 MILLIGRAM(S): 5 TABLET ORAL at 14:39

## 2025-04-10 ENCOUNTER — TRANSCRIPTION ENCOUNTER (OUTPATIENT)
Age: 40
End: 2025-04-10

## 2025-04-10 LAB
HCV AB S/CO SERPL IA: 0.18 S/CO — SIGNIFICANT CHANGE UP (ref 0–0.79)
HCV AB SERPL-IMP: SIGNIFICANT CHANGE UP